# Patient Record
(demographics unavailable — no encounter records)

---

## 2025-02-04 NOTE — PHYSICAL EXAM
[Alert] : alert [Well Nourished] : well nourished [No Acute Distress] : no acute distress [Well Developed] : well developed [Normal Sclera/Conjunctiva] : normal sclera/conjunctiva [EOMI] : extra ocular movement intact [No Proptosis] : no proptosis [Normal Oropharynx] : the oropharynx was normal [No Respiratory Distress] : no respiratory distress [No Accessory Muscle Use] : no accessory muscle use [Clear to Auscultation] : lungs were clear to auscultation bilaterally [Normal S1, S2] : normal S1 and S2 [Normal Rate] : heart rate was normal [Regular Rhythm] : with a regular rhythm [No Edema] : no peripheral edema [Pedal Pulses Normal] : the pedal pulses are present [Normal Bowel Sounds] : normal bowel sounds [Not Tender] : non-tender [Not Distended] : not distended [Soft] : abdomen soft [Normal Gait] : normal gait [No Rash] : no rash [No Tremors] : no tremors [Oriented x3] : oriented to person, place, and time [Acanthosis Nigricans] : no acanthosis nigricans [de-identified] : thyroid enlarged with nodular surface b/L

## 2025-02-04 NOTE — ASSESSMENT
[FreeTextEntry1] : HYpoT ; cont lt4 50 mcg qd  check Tfts today and will call w results.   MNG ; declines compressive sx  FNA of  R upper nodule 2. cm complex and R mid pole 1cm hypoechoic, irregular margins.  L upper pole 1.7 cm   all 3 benign. REpeat US in OCt 2205   HLD: check lipids.   Low fat/low cholesterol diet and weight loss advised cont statin   Prediabetes: check A1c  discussed diet and exercise encouraged more exercise walking 30 min 3 x week  Overweight:  discussed diet and exercise encouraged more exercise walking 30 min 3 x week  Bone health : osteopenia with FRAX 93.1 / 1.9 % weight bearing exercises advised  calcium supplem vit d supplemt

## 2025-02-17 NOTE — PLAN
[FreeTextEntry1] : 69-year-old female presents for evaluation Tracheobronchitis-rhinosinusitis right base congestion Doxycycline/dexamethasone 10 mg daily No tobacco use Diabetes mellitus-controlled on medication Hypertension-132/84 controlled on medication Thyroid nodule-follow-up with endocrinology L-thyroxine

## 2025-02-17 NOTE — HEALTH RISK ASSESSMENT
[0] : 2) Feeling down, depressed, or hopeless: Not at all (0) [PHQ-2 Negative - No further assessment needed] : PHQ-2 Negative - No further assessment needed [[id number]] : 0

## 2025-05-16 NOTE — REVIEW OF SYSTEMS
[Fever] : no fever [Earache] : no earache [Hoarseness] : hoarseness [Nasal Discharge] : no nasal discharge [Shortness Of Breath] : no shortness of breath [Cough] : no cough

## 2025-05-16 NOTE — ASSESSMENT
[FreeTextEntry1] : 69F here for "losing my voice." Hx. HTN, HLD, liver hemangioma. States yesterday she "lost her voice" and has been experiencing hoarseness. Mild sore throat that resolved with OTC "throat spray."  Denies cough, fever, rhinorrhea, sinus pressure, ear pain. Denies sore throat pain today. No dyspnea.  VSS, PE was grossly stable. No acute distress. Lungs are clear, mild b/l cervical lymphadenopathy.  Viral Laryngitis- conservative management with NSAIDS, educated on the viral nature of laryngitis.  Strep panel negative, viral swab pending.  RTO or call for any concerns.

## 2025-06-23 NOTE — PLAN
[FreeTextEntry1] : 69-year-old female presents for evaluation Cellulitis-bitten by an insect on the right foot.  Developed swelling pain and erythema.  Empiric Keflex 500 mg 4 times daily for 10 leg elevated monitor for fever or worsening symptomatology including ascending lymphangitis Tracheobronchitis-rhinosinusitis right base congestion Doxycycline/dexamethasone 10 mg daily No tobacco use Diabetes mellitus-controlled on medication Hypertension-132/84 controlled on medication Thyroid nodule-follow-up with endocrinology L-thyroxine

## 2025-06-23 NOTE — HEALTH RISK ASSESSMENT
[0] : 2) Feeling down, depressed, or hopeless: Not at all (0) [PHQ-2 Negative - No further assessment needed] : PHQ-2 Negative - No further assessment needed [[id number]] : 0

## 2025-06-25 NOTE — HISTORY OF PRESENT ILLNESS
[FreeTextEntry1] : Swollen feet [de-identified] : afebrile/treated for URI 3 dyas   nyquil helps Insect bite with 10 days of Keflex in progress

## 2025-06-25 NOTE — PLAN
[FreeTextEntry1] : 69-year-old female presents for evaluation Regional edema-there is swelling noted to the right lower extremity from the knee down to the foot.  Since she was bitten by the apparent spider.  1+ edema noted pitting No pain noted Homans' sign negative noted.  Positive pulses strong Lasix 40 mg once daily/KCl 20 mEq once daily x 10 days elevation of leg and reassurance Cellulitis-bitten by an insect on the right foot.  Developed swelling pain and erythema.  Empiric Keflex 500 mg 4 times daily for 10 leg elevated monitor for fever or worsening symptomatology including ascending lymphangitis No tobacco use Diabetes mellitus-controlled on medication Hypertension-132/84 controlled on medication Thyroid nodule-follow-up with endocrinology L-thyroxine

## 2025-06-25 NOTE — HEALTH RISK ASSESSMENT
[0] : 2) Feeling down, depressed, or hopeless: Not at all (0) [PHQ-2 Negative - No further assessment needed] : PHQ-2 Negative - No further assessment needed [[id number]] : 0